# Patient Record
Sex: FEMALE | Race: OTHER | URBAN - METROPOLITAN AREA
[De-identification: names, ages, dates, MRNs, and addresses within clinical notes are randomized per-mention and may not be internally consistent; named-entity substitution may affect disease eponyms.]

---

## 2019-08-12 NOTE — PATIENT DISCUSSION
The patient was informed that with [employer] [facility] for near, they will need glasses for all intermediate and distance activities after surgery. The patient understands there is a possibility they may need an enhancement after surgery. The patient elects [employer] [facility] OD, goal of -2.00.

## 2019-11-21 ENCOUNTER — PREPPED CHART (OUTPATIENT)
Dept: URBAN - METROPOLITAN AREA CLINIC 25 | Facility: CLINIC | Age: 54
End: 2019-11-21

## 2021-01-26 ENCOUNTER — OFFICE VISIT (OUTPATIENT)
Dept: URBAN - METROPOLITAN AREA TELEHEALTH 2 | Facility: TELEHEALTH | Age: 56
End: 2021-01-26

## 2021-03-24 ENCOUNTER — OFFICE VISIT (OUTPATIENT)
Dept: URBAN - METROPOLITAN AREA SURGERY CENTER 4 | Facility: SURGERY CENTER | Age: 56
End: 2021-03-24

## 2021-03-29 ENCOUNTER — OFFICE VISIT (OUTPATIENT)
Dept: URBAN - METROPOLITAN AREA SURGERY CENTER 4 | Facility: SURGERY CENTER | Age: 56
End: 2021-03-29

## 2021-03-31 LAB — PATHOLOGY (INDENTED REPORT): (no result)

## 2021-04-06 ENCOUNTER — OFFICE VISIT (OUTPATIENT)
Dept: URBAN - METROPOLITAN AREA CLINIC 60 | Facility: CLINIC | Age: 56
End: 2021-04-06

## 2021-04-28 ENCOUNTER — ESTABLISHED COMPREHENSIVE EXAM (OUTPATIENT)
Dept: URBAN - METROPOLITAN AREA CLINIC 25 | Facility: CLINIC | Age: 56
End: 2021-04-28

## 2021-04-28 DIAGNOSIS — H52.4: ICD-10-CM

## 2021-04-28 DIAGNOSIS — H52.223: ICD-10-CM

## 2021-04-28 DIAGNOSIS — H52.13: ICD-10-CM

## 2021-04-28 PROCEDURE — 92014 COMPRE OPH EXAM EST PT 1/>: CPT

## 2021-04-28 PROCEDURE — 92310-2 LEVEL 2 CONTACT LENS MANAGEMENT

## 2021-04-28 PROCEDURE — 92015 DETERMINE REFRACTIVE STATE: CPT

## 2021-04-28 PROCEDURE — 92499RSE RETINAL SCREENING ELECTIVE

## 2021-04-28 ASSESSMENT — VISUAL ACUITY
OD_CC: 20/20
OS_CC: 20/25

## 2021-04-28 ASSESSMENT — TONOMETRY
OD_IOP_MMHG: 12
OS_IOP_MMHG: 12
OS_IOP_MMHG: 17
OD_IOP_MMHG: 19

## 2022-07-09 ENCOUNTER — TELEPHONE ENCOUNTER (OUTPATIENT)
Dept: URBAN - METROPOLITAN AREA CLINIC 121 | Facility: CLINIC | Age: 57
End: 2022-07-09

## 2022-07-10 ENCOUNTER — TELEPHONE ENCOUNTER (OUTPATIENT)
Dept: URBAN - METROPOLITAN AREA CLINIC 121 | Facility: CLINIC | Age: 57
End: 2022-07-10

## 2022-09-07 ENCOUNTER — ESTABLISHED PATIENT (OUTPATIENT)
Dept: URBAN - METROPOLITAN AREA CLINIC 25 | Facility: CLINIC | Age: 57
End: 2022-09-07

## 2022-09-07 DIAGNOSIS — H52.4: ICD-10-CM

## 2022-09-07 DIAGNOSIS — H52.223: ICD-10-CM

## 2022-09-07 DIAGNOSIS — H52.13: ICD-10-CM

## 2022-09-07 PROCEDURE — 92310-2 LEVEL 2 CONTACT LENS MANAGEMENT

## 2022-09-07 PROCEDURE — 92015 DETERMINE REFRACTIVE STATE: CPT

## 2022-09-07 PROCEDURE — 92014 COMPRE OPH EXAM EST PT 1/>: CPT

## 2022-09-07 ASSESSMENT — KERATOMETRY
OD_K1POWER_DIOPTERS: 44.00
OS_AXISANGLE2_DEGREES: 44
OS_K2POWER_DIOPTERS: 45.50
OD_AXISANGLE2_DEGREES: 116
OD_K2POWER_DIOPTERS: 44.75
OS_AXISANGLE_DEGREES: 134
OD_AXISANGLE_DEGREES: 26
OS_K1POWER_DIOPTERS: 44.50

## 2022-09-07 ASSESSMENT — VISUAL ACUITY
OD_CC: 20/20
OS_CC: 20/50

## 2022-09-07 ASSESSMENT — TONOMETRY
OS_IOP_MMHG: 14
OD_IOP_MMHG: 20

## 2023-09-11 ENCOUNTER — ESTABLISHED PATIENT (OUTPATIENT)
Dept: URBAN - METROPOLITAN AREA CLINIC 25 | Facility: CLINIC | Age: 58
End: 2023-09-11

## 2023-09-11 DIAGNOSIS — H52.4: ICD-10-CM

## 2023-09-11 DIAGNOSIS — H52.223: ICD-10-CM

## 2023-09-11 DIAGNOSIS — H52.13: ICD-10-CM

## 2023-09-11 PROCEDURE — 92310-3 LEVEL 3 CONTACT LENS MANAGEMENT

## 2023-09-11 PROCEDURE — 92015 DETERMINE REFRACTIVE STATE: CPT

## 2023-09-11 PROCEDURE — 92014 COMPRE OPH EXAM EST PT 1/>: CPT

## 2023-09-11 ASSESSMENT — KERATOMETRY
OD_K1POWER_DIOPTERS: 44.00
OD_AXISANGLE_DEGREES: 26
OS_AXISANGLE_DEGREES: 134
OS_K1POWER_DIOPTERS: 44.50
OS_AXISANGLE2_DEGREES: 44
OS_K2POWER_DIOPTERS: 45.50
OD_K2POWER_DIOPTERS: 44.75
OD_AXISANGLE2_DEGREES: 116

## 2023-09-11 ASSESSMENT — TONOMETRY
OS_IOP_MMHG: 10
OD_IOP_MMHG: 13

## 2023-09-11 ASSESSMENT — VISUAL ACUITY
OS_CC: 20/40
OD_CC: 20/25

## 2024-09-12 ENCOUNTER — COMPREHENSIVE EXAM (OUTPATIENT)
Dept: URBAN - METROPOLITAN AREA CLINIC 25 | Facility: CLINIC | Age: 59
End: 2024-09-12

## 2024-09-12 DIAGNOSIS — H52.4: ICD-10-CM

## 2024-09-12 DIAGNOSIS — H52.223: ICD-10-CM

## 2024-09-12 DIAGNOSIS — H52.13: ICD-10-CM

## 2024-09-12 PROCEDURE — 92015 DETERMINE REFRACTIVE STATE: CPT

## 2024-09-12 PROCEDURE — 92014 COMPRE OPH EXAM EST PT 1/>: CPT

## 2024-09-12 PROCEDURE — 92310-3 LEVEL 3 CONTACT LENS MANAGEMENT: Mod: 21
